# Patient Record
Sex: MALE | Race: WHITE | Employment: FULL TIME | URBAN - METROPOLITAN AREA
[De-identification: names, ages, dates, MRNs, and addresses within clinical notes are randomized per-mention and may not be internally consistent; named-entity substitution may affect disease eponyms.]

---

## 2022-11-09 ENCOUNTER — TELEPHONE (OUTPATIENT)
Dept: CARDIOLOGY CLINIC | Age: 27
End: 2022-11-09

## 2022-11-10 ENCOUNTER — OFFICE VISIT (OUTPATIENT)
Dept: CARDIOLOGY CLINIC | Age: 27
End: 2022-11-10
Payer: COMMERCIAL

## 2022-11-10 ENCOUNTER — TELEPHONE (OUTPATIENT)
Dept: CARDIOLOGY CLINIC | Age: 27
End: 2022-11-10

## 2022-11-10 VITALS
HEART RATE: 79 BPM | WEIGHT: 144.3 LBS | DIASTOLIC BLOOD PRESSURE: 70 MMHG | SYSTOLIC BLOOD PRESSURE: 102 MMHG | HEIGHT: 71 IN | BODY MASS INDEX: 20.2 KG/M2

## 2022-11-10 DIAGNOSIS — E10.9 TYPE 1 DIABETES MELLITUS WITHOUT COMPLICATION (HCC): ICD-10-CM

## 2022-11-10 DIAGNOSIS — R00.0 TACHYCARDIA: ICD-10-CM

## 2022-11-10 DIAGNOSIS — R00.2 PALPITATIONS: ICD-10-CM

## 2022-11-10 DIAGNOSIS — R00.2 PALPITATIONS: Primary | ICD-10-CM

## 2022-11-10 LAB
25(OH)D3 SERPL-MCNC: 36.4 NG/ML (ref 30–100)
ALBUMIN SERPL-MCNC: 4 G/DL (ref 3.5–5)
ALBUMIN/GLOB SERPL: 1.2 {RATIO} (ref 0.4–1.6)
ALP SERPL-CCNC: 97 U/L (ref 50–136)
ALT SERPL-CCNC: 45 U/L (ref 12–65)
ANION GAP SERPL CALC-SCNC: 4 MMOL/L (ref 2–11)
AST SERPL-CCNC: 22 U/L (ref 15–37)
BASOPHILS # BLD: 0 K/UL (ref 0–0.2)
BASOPHILS NFR BLD: 1 % (ref 0–2)
BILIRUB SERPL-MCNC: 1.3 MG/DL (ref 0.2–1.1)
BUN SERPL-MCNC: 13 MG/DL (ref 6–23)
CALCIUM SERPL-MCNC: 9.6 MG/DL (ref 8.3–10.4)
CHLORIDE SERPL-SCNC: 102 MMOL/L (ref 101–110)
CO2 SERPL-SCNC: 31 MMOL/L (ref 21–32)
CREAT SERPL-MCNC: 0.9 MG/DL (ref 0.8–1.5)
DIFFERENTIAL METHOD BLD: ABNORMAL
EOSINOPHIL # BLD: 0 K/UL (ref 0–0.8)
EOSINOPHIL NFR BLD: 1 % (ref 0.5–7.8)
ERYTHROCYTE [DISTWIDTH] IN BLOOD BY AUTOMATED COUNT: 11.9 % (ref 11.9–14.6)
GLOBULIN SER CALC-MCNC: 3.3 G/DL (ref 2.8–4.5)
GLUCOSE SERPL-MCNC: 294 MG/DL (ref 65–100)
HCT VFR BLD AUTO: 47.8 % (ref 41.1–50.3)
HGB BLD-MCNC: 15.3 G/DL (ref 13.6–17.2)
IMM GRANULOCYTES # BLD AUTO: 0 K/UL (ref 0–0.5)
IMM GRANULOCYTES NFR BLD AUTO: 0 % (ref 0–5)
LYMPHOCYTES # BLD: 1.3 K/UL (ref 0.5–4.6)
LYMPHOCYTES NFR BLD: 31 % (ref 13–44)
MCH RBC QN AUTO: 29.7 PG (ref 26.1–32.9)
MCHC RBC AUTO-ENTMCNC: 32 G/DL (ref 31.4–35)
MCV RBC AUTO: 92.8 FL (ref 82–102)
MONOCYTES # BLD: 0.4 K/UL (ref 0.1–1.3)
MONOCYTES NFR BLD: 10 % (ref 4–12)
NEUTS SEG # BLD: 2.4 K/UL (ref 1.7–8.2)
NEUTS SEG NFR BLD: 57 % (ref 43–78)
NRBC # BLD: 0 K/UL (ref 0–0.2)
PLATELET # BLD AUTO: 267 K/UL (ref 150–450)
PMV BLD AUTO: 12.4 FL (ref 9.4–12.3)
POTASSIUM SERPL-SCNC: 4.4 MMOL/L (ref 3.5–5.1)
PROT SERPL-MCNC: 7.3 G/DL (ref 6.3–8.2)
RBC # BLD AUTO: 5.15 M/UL (ref 4.23–5.6)
SODIUM SERPL-SCNC: 137 MMOL/L (ref 133–143)
TSH W FREE THYROID IF ABNORMAL: 2.66 UIU/ML (ref 0.36–3.74)
WBC # BLD AUTO: 4.1 K/UL (ref 4.3–11.1)

## 2022-11-10 PROCEDURE — 99205 OFFICE O/P NEW HI 60 MIN: CPT | Performed by: INTERNAL MEDICINE

## 2022-11-10 PROCEDURE — 93000 ELECTROCARDIOGRAM COMPLETE: CPT | Performed by: INTERNAL MEDICINE

## 2022-11-10 RX ORDER — INSULIN ASPART 100 [IU]/ML
INJECTION, SOLUTION INTRAVENOUS; SUBCUTANEOUS
COMMUNITY
Start: 2018-05-17

## 2022-11-10 RX ORDER — BLOOD SUGAR DIAGNOSTIC
STRIP MISCELLANEOUS
COMMUNITY
Start: 2022-09-08

## 2022-11-10 NOTE — TELEPHONE ENCOUNTER
Pt mother came by after her son's visit to drop off VAERS report for Dr Regine Granado please reach out if there are any questions

## 2022-11-10 NOTE — PROGRESS NOTES
[address] [address], [address], [address] [place] [address]  PHONE: [phone]    SUBJECTIVE: Ok Rosalva Angeles (1995) is a 28 y.o. male seen for a follow up visit regarding the following:   Specialty Problems    None    Here for episodes of palpitations and tachycardia with rate up to 160s. Last seen 2018 for this when it occurred with exercise. When he exercises he gets tachy palpitations. In 2018 we worked up similar symptoms but at that time the monitor showed normal sinus rhythm we were unable to catch any of the episodes. He has had a viral illness in October and is been unable to exercise as much as he usually does and is now noticing that when he is active or exercising he is developing palpitations and tachyarrhythmias. We can place a routine Holter monitor check laboratory work and check an echo of note    He has an unusual issue where raising his arms above his head for any period of time can induce symptoms of paresthesias and some of his sweating palms and color changes on the dorsum of his hand raise the issue of possibly a needle endocrine hyperhidrosis type syndrome so we need to be aware of possibly thoracic outlet issues needle endocrine issues        Past Medical History, Past Surgical History, Family history, Social History, and Medications were all reviewed with the patient today and updated as necessary. Allergies   Allergen Reactions    Latex Rash     No past medical history on file. No past surgical history on file. No family history on file.    Social History     Tobacco Use    Smoking status: Never    Smokeless tobacco: Never   Substance Use Topics    Alcohol use: Not on file       Current Outpatient Medications:     insulin aspart (NOVOLOG) 100 UNIT/ML injection vial, by Pump fill route., Disp: , Rfl:     ONETOUCH ULTRA strip, TEST UP TO 10 TIMES DAILY, Disp: , Rfl:     ROS:  Review of Systems - General ROS: negative for - chills, fatigue or fever  Hematological and Lymphatic ROS: negative for - bleeding problems, bruising or jaundice  Respiratory ROS: no cough, shortness of breath, or wheezing  Cardiovascular ROS: positive for - palpitations and rapid heart rate  Gastrointestinal ROS: no abdominal pain, change in bowel habits, or black or bloody stools  Neurological ROS: no TIA or stroke symptoms  All other systems negative. Wt Readings from Last 3 Encounters:   11/10/22 144 lb 4.8 oz (65.5 kg)     Temp Readings from Last 3 Encounters:   No data found for Temp     BP Readings from Last 3 Encounters:   11/10/22 102/70     Pulse Readings from Last 3 Encounters:   11/10/22 79       PHYSICAL EXAM:  /70   Pulse 79   Ht 5' 11\" (1.803 m)   Wt 144 lb 4.8 oz (65.5 kg) Comment: with shoes  BMI 20.13 kg/m²   Physical Examination: General appearance - alert, well appearing, and in no distress  Mental status - alert, oriented to person, place, and time  Eyes - pupils equal and reactive, extraocular eye movements intact  Neck/lymph - supple, no significant adenopathy  Chest/lungs - clear to auscultation, no wheezes, rales or rhonchi, symmetric air entry  Heart/CV - normal rate, regular rhythm, normal S1, S2, no murmurs, rubs, clicks or gallops  Abdomen/GI - soft, nontender, nondistended, no masses or organomegaly  Musculoskeletal - no joint tenderness, deformity or swelling  Extremities - peripheral pulses normal, no pedal edema, no clubbing or cyanosis  Skin - normal coloration and turgor, no rashes, no suspicious skin lesions noted    EKG: normal sinus rhythm. Medications reviewed and questions answered    No results found for this or any previous visit (from the past 672 hour(s)).   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 Instructed to follow up appropriately and seek urgent medical care if acute or unstable issues arise. Results of some tests may be viewed thru [address] but this does not substitute for follow up with MD. If follow up is not scheduled pt is instructed to call for follow up    I have personally seen and evaluated the patient and reviewed the students note and agree with the following assessment and plan and findings. I was present for and observed the key components of this note. Any appropriate additions or editing of the information have been done by me.     Ani Rosenberg MD, [facility] [place]  [facility]

## 2022-11-11 ENCOUNTER — TELEPHONE (OUTPATIENT)
Dept: CARDIOLOGY CLINIC | Age: 27
End: 2022-11-11

## 2022-11-11 NOTE — TELEPHONE ENCOUNTER
Attempted to call number listed for patient contact and a number appears to be incorrect or disconnected.   Reason of the call was to inform patient of abnormal lab results

## 2022-11-18 ENCOUNTER — TELEPHONE (OUTPATIENT)
Dept: CARDIOLOGY CLINIC | Age: 27
End: 2022-11-18

## 2022-11-18 NOTE — TELEPHONE ENCOUNTER
Patients mother called stating she wanted to know if the results from the Echo had been read. Patients mom would like a call to discuss. Please call and discuss.

## 2022-11-22 NOTE — TELEPHONE ENCOUNTER
Pt's mother Brett Joyce) is calling requesting results. Per Dr. Debi Montilla, Echo looks normal. Called pt's mother back and informed her of Dr. Irving Solitario response. She wanted to know if that meant that her son does not have myocarditis. Per Dr. Debi Montilla that is exactly what that means. Understanding voiced.

## 2022-12-08 ENCOUNTER — TELEPHONE (OUTPATIENT)
Dept: CARDIOLOGY CLINIC | Age: 27
End: 2022-12-08

## 2022-12-08 NOTE — TELEPHONE ENCOUNTER
Pts mother called asking for the results from the A1C. Asking to mail the results if you can. Gaurav Cyr

## 2022-12-12 NOTE — TELEPHONE ENCOUNTER
Pt mother called back asking for the results. The appt with the endocrinologist in 2 days. Its fine for you to call her with the value.

## 2023-01-04 ENCOUNTER — OFFICE VISIT (OUTPATIENT)
Dept: CARDIOLOGY CLINIC | Age: 28
End: 2023-01-04
Payer: COMMERCIAL

## 2023-01-04 VITALS
HEIGHT: 71 IN | HEART RATE: 82 BPM | WEIGHT: 148 LBS | BODY MASS INDEX: 20.72 KG/M2 | SYSTOLIC BLOOD PRESSURE: 132 MMHG | DIASTOLIC BLOOD PRESSURE: 80 MMHG

## 2023-01-04 DIAGNOSIS — R00.2 PALPITATIONS: Primary | ICD-10-CM

## 2023-01-04 DIAGNOSIS — E10.9 TYPE 1 DIABETES MELLITUS WITHOUT COMPLICATION (HCC): ICD-10-CM

## 2023-01-04 DIAGNOSIS — R00.0 TACHYCARDIA: ICD-10-CM

## 2023-01-04 PROCEDURE — 99214 OFFICE O/P EST MOD 30 MIN: CPT | Performed by: INTERNAL MEDICINE

## 2023-01-04 NOTE — PROGRESS NOTES
[address] [place]  [address], [address], [address] [place] [address]  PHONE: [phone]    SUBJECTIVE: Marizol Paredes (1995) is a 28 y.o. male seen for a follow up visit regarding the following:   Specialty Problems    None    Here for episodes of palpitations and tachycardia with rate up to 160s. Last seen 2018 for this when it occurred with exercise. When he exercises he gets tachy palpitations. In 2018 we worked up similar symptoms but at that time the monitor showed normal sinus rhythm we were unable to catch any of the episodes. He has had a viral illness in October and is been unable to exercise as much as he usually does and is now noticing that when he is active or exercising he is developing palpitations and tachyarrhythmias. 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 We will need to elucidate if this was during activity or vigorous exercise if so this would be a physiologic response, if he was not exercising vigorously then this could be syndrome of inappropriate sinus tachycardia. Baseline echocardiogram is normal            Past Medical History, Past Surgical History, Family history, Social History, and Medications were all reviewed with the patient today and updated as necessary. Allergies   Allergen Reactions    Latex Rash     No past medical history on file.   No past surgical history on file. No family history on file. Social History     Tobacco Use    Smoking status: Never    Smokeless tobacco: Never   Substance Use Topics    Alcohol use: Not on file       Current Outpatient Medications:     insulin aspart (NOVOLOG) 100 UNIT/ML injection vial, by Pump fill route., Disp: , Rfl:     ONETOUCH ULTRA strip, TEST UP TO 10 TIMES DAILY, Disp: , Rfl:     ROS:  Review of Systems - General ROS: negative for - chills, fatigue or fever  Hematological and Lymphatic ROS: negative for - bleeding problems, bruising or jaundice  Respiratory ROS: no cough, shortness of breath, or wheezing  Cardiovascular ROS: positive for - palpitations and rapid heart rate  Gastrointestinal ROS: no abdominal pain, change in bowel habits, or black or bloody stools  Neurological ROS: no TIA or stroke symptoms  All other systems negative.     Wt Readings from Last 3 Encounters:   11/11/22 144 lb (65.3 kg)   11/10/22 144 lb 4.8 oz (65.5 kg)     Temp Readings from Last 3 Encounters:   No data found for Temp     BP Readings from Last 3 Encounters:   11/11/22 108/77   11/10/22 102/70     Pulse Readings from Last 3 Encounters:   11/10/22 79       PHYSICAL EXAM:  Ht 5' 11\" (1.803 m)   BMI 20.08 kg/m²   Physical Examination: General appearance - alert, well appearing, and in no distress  Mental status - alert, oriented to person, place, and time  Eyes - pupils equal and reactive, extraocular eye movements intact  Neck/lymph - supple, no significant adenopathy  Chest/lungs - clear to auscultation, no wheezes, rales or rhonchi, symmetric air entry  Heart/CV - normal rate, regular rhythm, normal S1, S2, no murmurs, rubs, clicks or gallops  Abdomen/GI - soft, nontender, nondistended, no masses or organomegaly  Musculoskeletal - no joint tenderness, deformity or swelling  Extremities - peripheral pulses normal, no pedal edema, no clubbing or cyanosis  Skin - normal coloration and turgor, no rashes, no suspicious skin lesions noted    EKG: normal sinus rhythm. Medications reviewed and questions answered    No results found for this or any previous visit (from the past 672 hour(s)). 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 Instructed to follow up appropriately and seek urgent medical care if acute or unstable issues arise. Results of some tests may be viewed thru [address] but this does not substitute for follow up with MD. If follow up is not scheduled pt is instructed to call for follow up    I have personally seen and evaluated the patient and reviewed the students note and agree with the following assessment and plan and findings. I was present for and observed the key components of this note. Any appropriate additions or editing of the information have been done by me.     Eamon Pastrana MD, [facility]  [facility]

## 2025-06-24 ENCOUNTER — OFFICE VISIT (OUTPATIENT)
Age: 30
End: 2025-06-24
Payer: COMMERCIAL

## 2025-06-24 VITALS
HEIGHT: 71 IN | WEIGHT: 160 LBS | HEART RATE: 78 BPM | BODY MASS INDEX: 22.4 KG/M2 | DIASTOLIC BLOOD PRESSURE: 72 MMHG | SYSTOLIC BLOOD PRESSURE: 118 MMHG

## 2025-06-24 DIAGNOSIS — Z76.89 ENCOUNTER TO ESTABLISH CARE: Primary | ICD-10-CM

## 2025-06-24 DIAGNOSIS — R00.2 PALPITATIONS: ICD-10-CM

## 2025-06-24 DIAGNOSIS — R53.82 CHRONIC FATIGUE: ICD-10-CM

## 2025-06-24 PROCEDURE — 99214 OFFICE O/P EST MOD 30 MIN: CPT | Performed by: INTERNAL MEDICINE

## 2025-06-24 PROCEDURE — 93000 ELECTROCARDIOGRAM COMPLETE: CPT | Performed by: INTERNAL MEDICINE

## 2025-06-24 NOTE — PROGRESS NOTES
2-3x weekly, will order Holter monitor for 2 weeks  -Order echo for baseline since it has been 3 years   -Order lipid panel, Lp(a), Apolipoprotein, T4, CMC, and CBC with diff   -Pending the results of Holter monitor will start BB or CBC PRN for symptom control     BM type 1:   - Pt not at goal. A1C in February was around 8.1.   -He sees an endocrinologist in [place] that increased his basal insulin and he made diet changes         Continue meds as below    Current Outpatient Medications:     insulin aspart (NOVOLOG) 100 UNIT/ML injection vial, by Pump fill route., Disp: , Rfl:     ONETOUCH ULTRA strip, TEST UP TO 10 TIMES DAILY (Patient not taking: Reported on 6/24/2025), Disp: , Rfl:     Orders Placed This Encounter    EKG 12 Lead - Clinic Performed     Reason for Exam?:   Irregular heart rate        Mallory Grams  6/24/2025  3:25 PM    Appropriate evaluation of guideline directed medical therapy for the patient's conditions have been reviewed.  If appropriate, adjustment of therapy for underlying cardiac issues has been offered.  If patient wishes for adjustment of therapy which would include intensification of pharmacologic therapy for any above conditions this will be sent to appropriate pharmacy.  If patient politely declines any further changes they will be encouraged to continue with current treatment and appropriate risk factor modification and healthy lifestyle.      Pt is instructed to follow all appropriate cardiac risk factor recommendations and to be compliant with meds and testing. Instructed to follow up appropriately and seek urgent medical care if acute or unstable issues arise. Results of some tests may be viewed thru MyChart but this does not substitute for follow up with MD. If follow up is not scheduled pt is instructed to call for follow up. Any non cardiac issues identified in this visit the patient is counseled to address these with their primary care physician or appropriate